# Patient Record
Sex: FEMALE | Race: WHITE | NOT HISPANIC OR LATINO | URBAN - METROPOLITAN AREA
[De-identification: names, ages, dates, MRNs, and addresses within clinical notes are randomized per-mention and may not be internally consistent; named-entity substitution may affect disease eponyms.]

---

## 2022-08-21 ENCOUNTER — EMERGENCY (EMERGENCY)
Facility: HOSPITAL | Age: 57
LOS: 1 days | Discharge: HOME | End: 2022-08-21
Admitting: EMERGENCY MEDICINE

## 2022-08-21 VITALS
DIASTOLIC BLOOD PRESSURE: 67 MMHG | HEART RATE: 78 BPM | OXYGEN SATURATION: 96 % | TEMPERATURE: 98 F | RESPIRATION RATE: 18 BRPM | SYSTOLIC BLOOD PRESSURE: 99 MMHG | WEIGHT: 134.92 LBS | HEIGHT: 66 IN

## 2022-08-21 DIAGNOSIS — S50.812A ABRASION OF LEFT FOREARM, INITIAL ENCOUNTER: ICD-10-CM

## 2022-08-21 DIAGNOSIS — S80.211A ABRASION, RIGHT KNEE, INITIAL ENCOUNTER: ICD-10-CM

## 2022-08-21 DIAGNOSIS — M25.531 PAIN IN RIGHT WRIST: ICD-10-CM

## 2022-08-21 DIAGNOSIS — M79.641 PAIN IN RIGHT HAND: ICD-10-CM
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
## 2022-08-21 DIAGNOSIS — E06.3 AUTOIMMUNE THYROIDITIS: ICD-10-CM

## 2022-08-21 DIAGNOSIS — S60.221A CONTUSION OF RIGHT HAND, INITIAL ENCOUNTER: ICD-10-CM
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
## 2022-08-21 PROCEDURE — 73100 X-RAY EXAM OF WRIST: CPT | Mod: 26,RT

## 2022-08-21 PROCEDURE — 99283 EMERGENCY DEPT VISIT LOW MDM: CPT | Mod: 25

## 2022-08-21 PROCEDURE — 73110 X-RAY EXAM OF WRIST: CPT | Mod: 26,RT

## 2022-08-21 NOTE — ED PROVIDER NOTE - OBJECTIVE STATEMENT
58yo F with h/o hashimotos presents with c/o mechanical trip and fall. states she landed on her right palm and is having some discomfort. she denies any blood thinners and denies any head injury. no other complaints. no numbness, tingling, weakness, fever, chills, ha, vision changes, neck or back pain, no abrasions to palm but small abrasion to knee.

## 2022-08-21 NOTE — ED ADULT TRIAGE NOTE - CHIEF COMPLAINT QUOTE
Pt came in c/o of right wrist pain after a mechanical trip and fall. CMS intact. No obvious deformity noted. small abrasion to left knee. Denies head strike. Tdap not utd

## 2022-08-21 NOTE — ED PROVIDER NOTE - CLINICAL SUMMARY MEDICAL DECISION MAKING FREE TEXT BOX
pt presents with right wrist pain after mechanical trip and fall. small superficial abrasino to right knee and left forearm, cleaned in ED. xray negative. offered analgesia and declined. will d/c.

## 2022-08-21 NOTE — ED PROVIDER NOTE - PATIENT PORTAL LINK FT
You can access the FollowMyHealth Patient Portal offered by [facility] by registering at the following website: [email or web address]. By joining [facility]’s FollowMyHealth portal, you will also be able to view your health information using other applications (apps) compatible with our system.

## 2022-08-21 NOTE — ED PROVIDER NOTE - PHYSICAL EXAMINATION
Constitutional: Well appearing, awake, alert, oriented to person, place, time/situation and in no apparent distress.  HEENT: Airway patent, NCAT  Resp: no conversational dyspnea, tachypnea, or signs of respiratory distress  Msk: ambulating with normal steady gait, right hand with minimal tenderness over the base of the palm, no tenderness to the distal radius or ulna, no snuffbox tenderness, normal AROM of the hand and wrist without pain, 2+ radial pulse, cap refill < 2 seconds  Neuro: A&Ox3   Skin: superficial abrasion to left forearm without bleeding, superficial abrasion to right knee.

## 2024-07-01 NOTE — ED ADULT NURSE NOTE - CAS EDP DISCH TYPE
Patient Seen in: [facility] Emergency Department      History     Chief Complaint   Patient presents with    Arrythmia/Palpitations     Stated Complaint: palpitations/ rapid heart beat    Subjective:   Is a 19-year-old female presents emergency room for evaluation of palpitations.  Patient flew back from [place] today.  She reports that upon landing she is developed palpitations.  She has had this intermittently in the past.  Patient is on Adderall for ADHD.  Patient reports that she has been on this medication for approximately 6 months.  Patient reports other than slight no other symptoms or risk factors for blood clot.  She has no family history of blood clots she is not a tobacco user does not take birth control no recent surgeries no recent callus.  Patient was tachycardic at home per her father's Apple Watch up to 185.  Patient currently has a heart rate ranging from 10 2-1 06 while was in the room.  It is regular and rhythm.  Patient appears in no distress she did have caffeine today.  Denies any drug use    The history is provided by the patient, a parent and a relative.           Objective:   Past Medical History:    Acne    ADHD (attention deficit hyperactivity disorder)              History reviewed. No pertinent surgical history.             Social History     Socioeconomic History    Marital status: Single   Tobacco Use    Smoking status: Never   Vaping Use    Vaping status: Never Used   Substance and Sexual Activity    Alcohol use: Yes     Comment: social    Drug use: Never              Review of Systems   Constitutional:  Negative for fever.   Cardiovascular:  Positive for palpitations.       Positive for stated Chief Complaint: Arrythmia/Palpitations    Other systems are as noted in HPI.  Constitutional and vital signs reviewed.      All other systems reviewed and negative except as noted above.    Physical Exam     ED Triage Vitals [06/30/24 2149]   /79   Pulse 106   Resp 16   Temp 98.3  °F (36.8 °C)   Temp src Temporal   SpO2 100 %   O2 Device None (Room air)       Current Vitals:   Vital Signs  BP: 104/75  Pulse: 81  Resp: 12  Temp: 98.3 °F (36.8 °C)  Temp src: Temporal  MAP (mmHg): 85    Oxygen Therapy  SpO2: 100 %  O2 Device: None (Room air)            Physical Exam  Vitals and nursing note reviewed.   Constitutional:       General: She is not in acute distress.     Appearance: Normal appearance. She is normal weight. She is not ill-appearing or toxic-appearing.   HENT:      Head: Normocephalic and atraumatic.      Mouth/Throat:      Mouth: Mucous membranes are moist.   Eyes:      Extraocular Movements: Extraocular movements intact.      Pupils: Pupils are equal, round, and reactive to light.   Cardiovascular:      Rate and Rhythm: Regular rhythm. Tachycardia present.      Pulses: Normal pulses.   Pulmonary:      Effort: Pulmonary effort is normal. No respiratory distress.      Breath sounds: Normal breath sounds. No wheezing.   Abdominal:      General: Bowel sounds are normal. There is no distension.      Palpations: Abdomen is soft.      Tenderness: There is no abdominal tenderness.   Musculoskeletal:         General: Normal range of motion.      Cervical back: Neck supple.   Skin:     General: Skin is warm.      Capillary Refill: Capillary refill takes less than 2 seconds.   Neurological:      General: No focal deficit present.      Mental Status: She is alert and oriented to person, place, and time.      Cranial Nerves: No cranial nerve deficit.   Psychiatric:         Mood and Affect: Mood normal.         Behavior: Behavior normal.               ED Course     Labs Reviewed   COMP METABOLIC PANEL (14) - Abnormal; Notable for the following components:       Result Value    Chloride 114 (*)     Calculated Osmolality 299 (*)     All other components within normal limits   CBC W/ DIFFERENTIAL - Abnormal; Notable for the following components:    RBC 5.37 (*)     HGB 11.1 (*)     HCT 34.4 (*)      MCV 64.1 (*)     MCH 20.7 (*)     All other components within normal limits   D-DIMER - Normal   MAGNESIUM - Normal   CBC WITH DIFFERENTIAL WITH PLATELET    Narrative:     The following orders were created for panel order CBC With Differential With Platelet.  Procedure                               Abnormality         Status                     ---------                               -----------         ------                     CBC W/ DIFFERENTIAL[[id number]]          Abnormal            Final result                 Please view results for these tests on the individual orders.   PATH COMMENT CBC   RAINBOW DRAW BLUE     EKG    Rate, intervals and axes as noted on EKG Report.  Rate:.100  Rhythm: Sinus Rhythm  Reading: Normal sinus rhythm no ST elevation [place] interval of 140 QRS of 64 QTc of 412 with axes of 53/85/58                 XR CHEST AP PORTABLE  (CPT=71045)    Result Date: 6/30/2024            PROCEDURE:  XR CHEST AP PORTABLE  (CPT=71045)  TECHNIQUE:  AP chest radiograph was obtained.  COMPARISON:  None.  INDICATIONS:  palpitations/ rapid heart beat  PATIENT STATED HISTORY: (As transcribed by Technologist)  The patient states she has a rapid heart rate.    FINDINGS: Cardiac silhouette and pulmonary vasculature are unremarkable. No consolidation, pleural effusion or pneumothorax. IMPRESSION: Unremarkable portable chest radiograph.   LOCATION:  [facility]      Dictated by (CST): Regino Askew MD on 6/30/2024 at 10:22 PM     Finalized by (CST): Regino Askew MD on 6/30/2024 at 10:23 PM               MDM      Social -negative tobacco, negative etoh, negative drugs denies pregnancy  Family History-noncontributory  Past Medical History-ADHD    Differential diagnosis before testing included medication side effects, caffeine use, dehydration, electrolyte abnormality, pulmonary embolism, viral syndrome    Co-morbidities that add to the complexity of management include: Is on ADHD medication    Testing ordered  during this visit included baseline labs D-dimer if positive will need a CT angio of the chest will get a chest x-ray as baseline.  EKG    Radiographic images  I personally reviewed the radiographs and my individual interpretation shows no acute process  I also reviewed the official reports that showed XR CHEST AP PORTABLE  (CPT=71045)    Result Date: 6/30/2024            PROCEDURE:  XR CHEST AP PORTABLE  (CPT=71045)  TECHNIQUE:  AP chest radiograph was obtained.  COMPARISON:  None.  INDICATIONS:  palpitations/ rapid heart beat  PATIENT STATED HISTORY: (As transcribed by Technologist)  The patient states she has a rapid heart rate.    FINDINGS: Cardiac silhouette and pulmonary vasculature are unremarkable. No consolidation, pleural effusion or pneumothorax. IMPRESSION: Unremarkable portable chest radiograph.   LOCATION:  [facility]      Dictated by (CST): Regino Askew MD on 6/30/2024 at 10:22 PM     Finalized by (CST): Regino Askew MD on 6/30/2024 at 10:23 PM          External chart review showed review of care everywhere in epic system shows no related comorbidities to current presentation other than patient is on ADHD medication    History obtained by an independent source included from, family patient    Discussion of management with patient, family    Social determinants of health that affect care include no listed primary care physician      Medications Provided: Metoprolol IV fluids    Course of Events during Emergency Room Visit include 19-year-old female presents emergency room with palpitations she is found to be in sinus tachycardia.  Per family Apple Watch registered heart rate over 180 at home.  Patient is currently just above 100 for her heart rate.  Is regular and rhythm.  Will check baseline labs get a chest x-ray EKG and will get a D-dimer if positive will need a CT angio of chest if negative patient is ruled out for pulmonary embolism.  Will recommend starting on low-dose metoprolol and  follow-up with cardiology for an outpatient Holter monitor.  To avoid caffeine use and over-the-counter cold medicines in the meantime.      Patient's response to IV metoprolol showed heart rate in the 80s she is feeling improved.  Her D-dimer is negative chest x-ray and lab work is unremarkable other than mild anemia.  Patient is recommended follow-up with cardiology as outpatient with her Holter monitor.  Will start on low-dose metoprolol.    Disposition:          Discharge  I have discussed with the patient the results of test, differential diagnosis, treatment plan, warning signs and symptoms which should prompt immediate return.  They expressed understanding of these instructions and agrees to the following plan provided.  They were given written discharge instructions and agrees to return for any concerns and voiced understanding and all questions were answered.                                      Medical Decision Making      Disposition and Plan     Clinical Impression:  1. Palpitations    2. Anemia, unspecified type         Disposition:  Discharge  6/30/2024 10:50 pm    Follow-up:  Alan Whitt MD  [address] [address]  [phone]    Schedule an appointment as soon as possible for a visit      Carter Pimentel MD  [address] [address]  [phone]    Schedule an appointment as soon as possible for a visit            Medications Prescribed:  Current Discharge Medication List        START taking these medications    Details   metoprolol tartrate 25 MG Oral Tab Take 0.5 tablets (12.5 mg total) by mouth 2 (two) times daily.  Qty: 30 tablet, Refills: 0                                             Home